# Patient Record
Sex: MALE | URBAN - METROPOLITAN AREA
[De-identification: names, ages, dates, MRNs, and addresses within clinical notes are randomized per-mention and may not be internally consistent; named-entity substitution may affect disease eponyms.]

---

## 2020-02-08 ENCOUNTER — NURSE TRIAGE (OUTPATIENT)
Dept: NURSING | Facility: CLINIC | Age: 20
End: 2020-02-08

## 2020-02-08 NOTE — TELEPHONE ENCOUNTER
Woke up with right ear being super muffled/pressure, but no pain.  If he plays a video from phone or listned to someone talking, can not understand anything when listening from R ear, feels like cotton balls stuffed in there.    Advised patient to be seen in Urgent Care in the next 24 hours and reviewed options closest to him.  Also advised to contact his insurance company if he is unsure what the coverage will be.    Shelia Gale RN on 2/8/2020 at 1:35 PM      Reason for Disposition    Earache persists > 1 hour    Protocols used: EAR - CONGESTION-A-AH